# Patient Record
Sex: FEMALE | Race: WHITE | Employment: UNEMPLOYED
[De-identification: names, ages, dates, MRNs, and addresses within clinical notes are randomized per-mention and may not be internally consistent; named-entity substitution may affect disease eponyms.]

---

## 2017-06-23 ENCOUNTER — HOSPITAL ENCOUNTER (OUTPATIENT)
Dept: GENERAL RADIOLOGY | Facility: CLINIC | Age: 67
Discharge: HOME | End: 2017-06-23
Payer: MEDICARE

## 2017-06-23 DIAGNOSIS — M53.3 SACROILIAC JOINT DYSFUNCTION OF LEFT SIDE: ICD-10-CM

## 2017-06-23 DIAGNOSIS — M25.552 LEFT HIP PAIN: ICD-10-CM

## 2017-06-23 PROCEDURE — 72202 X-RAY EXAM SI JOINTS 3/> VWS: CPT

## 2017-06-23 PROCEDURE — 73502 X-RAY EXAM HIP UNI 2-3 VIEWS: CPT

## 2017-09-27 PROBLEM — G47.00 INSOMNIA: Status: ACTIVE | Noted: 2017-09-27

## 2017-09-28 ENCOUNTER — TELEPHONE (OUTPATIENT)
Dept: CASE MANAGEMENT | Age: 67
End: 2017-09-28

## 2018-06-15 PROBLEM — N32.81 OVERACTIVE BLADDER: Status: ACTIVE | Noted: 2018-06-15

## 2018-09-25 PROBLEM — Z91.030 BEE STING ALLERGY: Status: ACTIVE | Noted: 2018-09-25

## 2018-10-09 ENCOUNTER — HOSPITAL ENCOUNTER (OUTPATIENT)
Dept: GENERAL RADIOLOGY | Facility: CLINIC | Age: 68
Discharge: HOME | End: 2018-10-11
Payer: MEDICARE
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
## 2018-10-09 DIAGNOSIS — M79.672 LEFT FOOT PAIN: ICD-10-CM

## 2018-10-09 PROCEDURE — 73630 X-RAY EXAM OF FOOT: CPT

## 2019-03-05 PROBLEM — I65.23 BILATERAL CAROTID ARTERY STENOSIS: Status: ACTIVE | Noted: 2019-03-05

## 2020-02-07 ENCOUNTER — HOSPITAL ENCOUNTER (OUTPATIENT)
Dept: CT IMAGING | Facility: CLINIC | Age: 70
Discharge: HOME | End: 2020-02-09
Payer: MEDICARE

## 2020-02-07 ENCOUNTER — HOSPITAL ENCOUNTER (OUTPATIENT)
Age: 70
Setting detail: SPECIMEN
Discharge: HOME | End: 2020-02-07
Payer: MEDICARE

## 2020-02-07 PROCEDURE — 74176 CT ABD & PELVIS W/O CONTRAST: CPT

## 2020-02-08 LAB
CULTURE: NORMAL
Lab: NORMAL
SPECIMEN DESCRIPTION: NORMAL

## 2025-04-07 ENCOUNTER — OFFICE VISIT (OUTPATIENT)
Age: 75
End: 2025-04-07

## 2025-04-07 VITALS
TEMPERATURE: 98.2 F | BODY MASS INDEX: 18.96 KG/M2 | WEIGHT: 107 LBS | RESPIRATION RATE: 18 BRPM | SYSTOLIC BLOOD PRESSURE: 119 MMHG | DIASTOLIC BLOOD PRESSURE: 72 MMHG | OXYGEN SATURATION: 97 % | HEIGHT: 63 IN | HEART RATE: 85 BPM

## 2025-04-07 DIAGNOSIS — H01.001 BLEPHARITIS OF RIGHT UPPER EYELID, UNSPECIFIED TYPE: Primary | ICD-10-CM

## 2025-04-07 RX ORDER — AMLODIPINE BESYLATE 5 MG/1
5 TABLET ORAL DAILY
COMMUNITY

## 2025-04-07 RX ORDER — ERYTHROMYCIN 5 MG/G
OINTMENT OPHTHALMIC 4 TIMES DAILY
Qty: 3.5 G | Refills: 0 | Status: SHIPPED | OUTPATIENT
Start: 2025-04-07

## 2025-04-07 RX ORDER — DIAPER,BRIEF,INFANT-TODD,DISP
EACH MISCELLANEOUS
Qty: 15 G | Refills: 0 | Status: SHIPPED | OUTPATIENT
Start: 2025-04-07

## 2025-04-07 RX ORDER — EZETIMIBE 10 MG/1
10 TABLET ORAL DAILY
COMMUNITY

## 2025-04-07 RX ORDER — DIAPER,BRIEF,INFANT-TODD,DISP
EACH MISCELLANEOUS
Qty: 15 G | Refills: 0 | Status: SHIPPED | OUTPATIENT
Start: 2025-04-07 | End: 2025-04-07

## 2025-04-07 ASSESSMENT — ENCOUNTER SYMPTOMS
SORE THROAT: 0
SHORTNESS OF BREATH: 0
EYE REDNESS: 1
VOICE CHANGE: 0
TROUBLE SWALLOWING: 0
CONSTIPATION: 0
VOMITING: 0
ABDOMINAL PAIN: 0
FOREIGN BODY SENSATION: 0
BACK PAIN: 0
DOUBLE VISION: 0
EYE PAIN: 0
EYE ITCHING: 0
COUGH: 0
BLURRED VISION: 0
DIARRHEA: 0
COLOR CHANGE: 0
PHOTOPHOBIA: 0
EYE DISCHARGE: 0
NAUSEA: 0
CHEST TIGHTNESS: 0

## 2025-04-07 NOTE — PROGRESS NOTES
Veronica Taveras (: 1950) is a 74 y.o. female, New patient, here for evaluation of  Eye Problem (Right eye for 2 days)    This is a 74 y.o. patient presented to the urgent care today with complaint of right upper eyelid swelling and redness for the last 2 days.    She reports some mild itching.  Unaware of the cause.  Denies any recent bite.  She denies any injury or fall.  Denies using any new products.  No discharge from the eye.  Denies any upper respiratory infection-like symptoms.  No sore throat difficulty swallowing.  No neck pain.  No headache.  No fever. All review of systems are mentioned in the HPI otherwise unremarkable.       History provided by:  Patient  Eye Problem   The left eye is affected. This is a new problem. The current episode started in the past 7 days. The problem occurs constantly. The problem has been unchanged. There was no injury mechanism. The pain is at a severity of 2/10. The pain is mild. There is No known exposure to pink eye. She Does not wear contacts. Associated symptoms include eye redness. Pertinent negatives include no blurred vision, eye discharge, double vision, fever, foreign body sensation, itching, nausea, photophobia, recent URI, vomiting or weakness. She has tried nothing for the symptoms.        PAST MEDICAL HISTORY    Past Medical History:   Diagnosis Date    B12 deficiency     Breast cancer     CAD (coronary artery disease)     Hypercholesteremia        SURGICAL HISTORY    Past Surgical History:   Procedure Laterality Date    BREAST SURGERY      CARDIAC SURGERY      2 stents - 2006    COLONOSCOPY  2020    polyps re ck in 3 yr        CURRENT MEDICATIONS    Current Outpatient Rx   Medication Sig Dispense Refill    ezetimibe (ZETIA) 10 MG tablet Take 1 tablet by mouth daily      amLODIPine (NORVASC) 5 MG tablet Take 1 tablet by mouth daily      erythromycin (ROMYCIN) 5 MG/GM ophthalmic ointment Place into the right eye 4 times daily 3.5 g 0

## 2025-05-28 ENCOUNTER — OFFICE VISIT (OUTPATIENT)
Age: 75
End: 2025-05-28

## 2025-05-28 VITALS
WEIGHT: 105.4 LBS | DIASTOLIC BLOOD PRESSURE: 76 MMHG | HEIGHT: 63 IN | TEMPERATURE: 97.8 F | RESPIRATION RATE: 15 BRPM | OXYGEN SATURATION: 96 % | SYSTOLIC BLOOD PRESSURE: 132 MMHG | BODY MASS INDEX: 18.68 KG/M2 | HEART RATE: 82 BPM

## 2025-05-28 DIAGNOSIS — M54.9 MID BACK PAIN ON RIGHT SIDE: Primary | ICD-10-CM

## 2025-05-28 RX ORDER — BACLOFEN 10 MG/1
5 TABLET ORAL NIGHTLY PRN
Qty: 15 TABLET | Refills: 0 | Status: SHIPPED | OUTPATIENT
Start: 2025-05-28

## 2025-05-28 RX ORDER — LIDOCAINE 50 MG/G
1 PATCH TOPICAL DAILY
Qty: 10 PATCH | Refills: 0 | Status: SHIPPED | OUTPATIENT
Start: 2025-05-28 | End: 2025-06-07

## 2025-05-28 ASSESSMENT — ENCOUNTER SYMPTOMS
SHORTNESS OF BREATH: 0
CONSTIPATION: 0
VOICE CHANGE: 0
ABDOMINAL PAIN: 0
DIARRHEA: 0
EYE REDNESS: 0
CHEST TIGHTNESS: 0
COLOR CHANGE: 0
NAUSEA: 0
EYE PAIN: 0
BACK PAIN: 0
COUGH: 0
VOMITING: 0
SORE THROAT: 0
TROUBLE SWALLOWING: 0

## 2025-05-28 NOTE — PROGRESS NOTES
not warranted based on physical exam.  \"Based on the patient's current clinical status and physical examination findings, the patient is stable and can be safely discharged with prescribed medications and close outpatient follow-up.\"  I Reviewed prescribed and recommended medications, administration, and side effects.         Discharge instructions:     - Drink enough amount of fluids throughout the day to prevent dehydration.  - Rest. Advance activities and diet as tolerated.  - Continue your home medication as usual.   - Take the prescribed medications as instructed.  - Tylenol / Motrin as needed for pain and fever   - Follow up with your PCP as instructed.   - Return to the Urgent care or go to the closest ER if your symptoms persist, worsen or any other concerns. Patient verbalized understanding.    An electronic signature was used to authenticate this note.    --ANEUDY Burt - CNP